# Patient Record
Sex: FEMALE | Race: BLACK OR AFRICAN AMERICAN
[De-identification: names, ages, dates, MRNs, and addresses within clinical notes are randomized per-mention and may not be internally consistent; named-entity substitution may affect disease eponyms.]

---

## 2023-04-12 ENCOUNTER — HOSPITAL ENCOUNTER (EMERGENCY)
Dept: HOSPITAL 76 - ED | Age: 28
Discharge: HOME | End: 2023-04-12
Payer: COMMERCIAL

## 2023-04-12 VITALS — DIASTOLIC BLOOD PRESSURE: 68 MMHG | SYSTOLIC BLOOD PRESSURE: 124 MMHG

## 2023-04-12 DIAGNOSIS — R10.13: Primary | ICD-10-CM

## 2023-04-12 LAB
ALBUMIN DIAFP-MCNC: 4.2 G/DL (ref 3.2–5.5)
ALBUMIN/GLOB SERPL: 1.2 {RATIO} (ref 1–2.2)
ALP SERPL-CCNC: 72 IU/L (ref 42–121)
ALT SERPL W P-5'-P-CCNC: 15 IU/L (ref 10–60)
ANION GAP SERPL CALCULATED.4IONS-SCNC: 8 MMOL/L (ref 6–13)
AST SERPL W P-5'-P-CCNC: 23 IU/L (ref 10–42)
BASOPHILS NFR BLD AUTO: 0 10^3/UL (ref 0–0.1)
BASOPHILS NFR BLD AUTO: 0.5 %
BILIRUB BLD-MCNC: 0.5 MG/DL (ref 0.2–1)
BUN SERPL-MCNC: 9 MG/DL (ref 6–20)
CALCIUM UR-MCNC: 8.9 MG/DL (ref 8.5–10.3)
CHLORIDE SERPL-SCNC: 104 MMOL/L (ref 101–111)
CLARITY UR REFRACT.AUTO: CLEAR
CO2 SERPL-SCNC: 23 MMOL/L (ref 21–32)
CREAT SERPLBLD-SCNC: 0.9 MG/DL (ref 0.4–1)
EOSINOPHIL # BLD AUTO: 0 10^3/UL (ref 0–0.7)
EOSINOPHIL NFR BLD AUTO: 0.3 %
ERYTHROCYTE [DISTWIDTH] IN BLOOD BY AUTOMATED COUNT: 11.9 % (ref 12–15)
GFRSERPLBLD MDRD-ARVRAT: 90 ML/MIN/{1.73_M2} (ref 89–?)
GLOBULIN SER-MCNC: 3.5 G/DL (ref 2.1–4.2)
GLUCOSE SERPL-MCNC: 113 MG/DL (ref 70–100)
GLUCOSE UR QL STRIP.AUTO: NEGATIVE MG/DL
HCG UR QL: NEGATIVE
HCT VFR BLD AUTO: 42.3 % (ref 37–47)
HGB UR QL STRIP: 14.3 G/DL (ref 12–16)
KETONES UR QL STRIP.AUTO: NEGATIVE MG/DL
LIPASE SERPL-CCNC: 41 U/L (ref 22–51)
LYMPHOCYTES # SPEC AUTO: 1.5 10^3/UL (ref 1.5–3.5)
LYMPHOCYTES NFR BLD AUTO: 17.4 %
MCH RBC QN AUTO: 29.8 PG (ref 27–31)
MCHC RBC AUTO-ENTMCNC: 33.8 G/DL (ref 32–36)
MCV RBC AUTO: 88.1 FL (ref 81–99)
MONOCYTES # BLD AUTO: 0.5 10^3/UL (ref 0–1)
MONOCYTES NFR BLD AUTO: 5.9 %
NEUTROPHILS # BLD AUTO: 6.5 10^3/UL (ref 1.5–6.6)
NEUTROPHILS # SNV AUTO: 8.6 X10^3/UL (ref 4.8–10.8)
NEUTROPHILS NFR BLD AUTO: 75.6 %
NITRITE UR QL STRIP.AUTO: NEGATIVE
NRBC # BLD AUTO: 0 /100WBC
NRBC # BLD AUTO: 0 X10^3/UL
PDW BLD AUTO: 9.2 FL (ref 7.9–10.8)
PH UR STRIP.AUTO: 6 PH (ref 5–7.5)
PLATELET # BLD: 339 10^3/UL (ref 130–450)
POTASSIUM SERPL-SCNC: 3.2 MMOL/L (ref 3.5–5)
PROT SPEC-MCNC: 7.7 G/DL (ref 6.7–8.2)
PROT UR STRIP.AUTO-MCNC: NEGATIVE MG/DL
RBC # UR STRIP.AUTO: NEGATIVE /UL
RBC MAR: 4.8 10^6/UL (ref 4.2–5.4)
SODIUM SERPLBLD-SCNC: 135 MMOL/L (ref 135–145)
SP GR UR STRIP.AUTO: <=1.005 (ref 1–1.03)
UROBILINOGEN UR QL STRIP.AUTO: (no result) E.U./DL
UROBILINOGEN UR STRIP.AUTO-MCNC: NEGATIVE MG/DL

## 2023-04-12 PROCEDURE — 85025 COMPLETE CBC W/AUTO DIFF WBC: CPT

## 2023-04-12 PROCEDURE — 83690 ASSAY OF LIPASE: CPT

## 2023-04-12 PROCEDURE — 80053 COMPREHEN METABOLIC PANEL: CPT

## 2023-04-12 PROCEDURE — 81025 URINE PREGNANCY TEST: CPT

## 2023-04-12 PROCEDURE — 81003 URINALYSIS AUTO W/O SCOPE: CPT

## 2023-04-12 PROCEDURE — 87086 URINE CULTURE/COLONY COUNT: CPT

## 2023-04-12 PROCEDURE — 99283 EMERGENCY DEPT VISIT LOW MDM: CPT

## 2023-04-12 PROCEDURE — 36415 COLL VENOUS BLD VENIPUNCTURE: CPT

## 2023-04-12 PROCEDURE — 81001 URINALYSIS AUTO W/SCOPE: CPT

## 2023-04-12 NOTE — ED PHYSICIAN DOCUMENTATION
PD HPI NVD





- Stated complaint


Stated Complaint: ABD PX





- Chief complaint


Chief Complaint: Abd Pain





- History obtained from


History obtained from: Patient





- Additonal information


Additional information: 


Patient is a 27-year-old female presenting for evaluation of epigastric 

abdominal pain with nausea that started this morning.  Patient reports that the 

pain was much more sharp and severe this morning but has improved after taking 

Midol.  She denies any vomiting and denies any radiation to her symptoms.  

Denies any diarrhea.  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 

alcohol. She reports that her command made her come in today for an evaluation.








Review of Systems


Constitutional: denies: Fever


Cardiac: denies: Chest pain / pressure


Respiratory: denies: Dyspnea


GI: reports: Abdominal Pain, Nausea.  denies: Vomiting, Diarrhea


: denies: Dysuria


Musculoskeletal: denies: Back pain


Neurologic: denies: Headache





PD PAST MEDICAL HISTORY





- Past Medical History


Past Medical History: Yes


Neuro: Migraines





- Present Medications


Home Medications: 


                                Ambulatory Orders











 Medication  Instructions  Recorded  Confirmed


 


Ondansetron Odt [Zofran] 4 mg TL Q6H PRN #10 tablet 04/12/23 














- Allergies


Allergies/Adverse Reactions: 


                                    Allergies











Allergy/AdvReac Type Severity Reaction Status Date / Time


 


No Known Drug Allergies Allergy   Verified 04/12/23 10:25














- Social History


Does the pt smoke?: No


Smoking Status: Never smoker





PD ED PE NORMAL





- General


General: Alert and oriented X 3, No acute distress, Well developed/nourished





- HEENT


HEENT: Atraumatic





- Neck


Neck: Supple, no meningeal sign





- Cardiac


Cardiac: RRR





- Respiratory


Respiratory: No respiratory distress, Clear bilaterally





- Abdomen


Abdomen: Normal bowel sounds, Soft, Non distended, Other (Mild epigastric 

tenderness to palpation, no right upper quadrant tenderness or Tenderness 

elsewhere; )





- Derm


Derm: Warm and dry





- Neuro


Neuro: Normal speech





Results





- Vitals


Vitals: 


                               Vital Signs - 24 hr











  04/12/23 04/12/23





  10:22 11:35


 


Temperature 36.4 C L 


 


Heart Rate 98 68


 


Respiratory 16 16





Rate  


 


Blood Pressure 152/104 H 124/68


 


O2 Saturation 99 100








                                     Oxygen











O2 Source                      Room air

















- Labs


Labs: 


                                Laboratory Tests











  04/12/23 04/12/23 04/12/23





  10:40 10:47 10:47


 


WBC   8.6 


 


RBC   4.80 


 


Hgb   14.3 


 


Hct   42.3 


 


MCV   88.1 


 


MCH   29.8 


 


MCHC   33.8 


 


RDW   11.9 L 


 


Plt Count   339 


 


MPV   9.2 


 


Neut # (Auto)   6.5 


 


Lymph # (Auto)   1.5 


 


Mono # (Auto)   0.5 


 


Eos # (Auto)   0.0 


 


Baso # (Auto)   0.0 


 


Absolute Nucleated RBC   0.00 


 


Nucleated RBC %   0.0 


 


Sodium    135


 


Potassium    3.2 L


 


Chloride    104


 


Carbon Dioxide    23


 


Anion Gap    8.0


 


BUN    9


 


Creatinine    0.9


 


Estimated GFR (MDRD)    90


 


Glucose    113 H


 


Calcium    8.9


 


Total Bilirubin    0.5


 


AST    23


 


ALT    15


 


Alkaline Phosphatase    72


 


Total Protein    7.7


 


Albumin    4.2


 


Globulin    3.5


 


Albumin/Globulin Ratio    1.2


 


Lipase    41


 


Urine Color  LIGHT YELLOW  


 


Urine Clarity  CLEAR  


 


Urine pH  6.0  


 


Ur Specific Gravity  <=1.005  


 


Urine Protein  NEGATIVE  


 


Urine Glucose (UA)  NEGATIVE  


 


Urine Ketones  NEGATIVE  


 


Urine Occult Blood  NEGATIVE  


 


Urine Nitrite  NEGATIVE  


 


Urine Bilirubin  NEGATIVE  


 


Urine Urobilinogen  0.2 (NORMAL)  


 


Ur Leukocyte Esterase  NEGATIVE  


 


Ur Microscopic Review  NOT INDICATED  


 


Urine Culture Comments  NOT INDICATED  


 


Urine HCG, Qual  NEGATIVE  














PD Medical Decision Making





- ED course


Complexity details: reviewed results, re-evaluated patient


ED course: 


Patient presenting for evaluation of epigastric abdominal pain with nausea.  Her

 urine analysis is negative for infection or pregnancy.  CBC and chemistries 

were also reviewed with only significant finding to include a potassium of 3.2 

which was replaced p.o.  Her abdominal exam is benign.  She has had no vomiting 

here.  She is tolerating p.o. including her potassium replacement.She has no 

right upper quadrant tenderness or pain elsewhere on exam and do not think 

imaging would be helpful at this time.  Patient counseled on continued 

supportive care as well as concerning symptoms to return for.








Departure





- Departure


Disposition: 01 Home, Self Care


Clinical Impression: 


 Epigastric abdominal pain





Condition: Stable


Instructions:  ED Abdominal Pain Female Non-Specific Abdominal Pain


Follow-Up: 


[facility] [place] [Provider Group]


Prescriptions: 


Ondansetron Odt [Zofran] 4 mg TL Q6H PRN #10 tablet


 PRN Reason: Nausea / Vomiting


Comments: 


Your labs are reassuring today.  Your urine also does not show infection.  Your 

pregnancy test is negative.





I have sent a prescription for antinausea medications to the [facility] [place].  Please stick with a bland diet today.  If you develop any new or 

worsening symptoms please consider return to the emergency department.


Forms:  Activity restrictions


Discharge Date/Time: 04/12/23 11:36

## 2023-05-05 ENCOUNTER — HOSPITAL ENCOUNTER (EMERGENCY)
Dept: HOSPITAL 76 - ED | Age: 28
Discharge: HOME | End: 2023-05-05
Payer: COMMERCIAL

## 2023-05-05 VITALS — DIASTOLIC BLOOD PRESSURE: 80 MMHG | SYSTOLIC BLOOD PRESSURE: 132 MMHG
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
## 2023-05-05 DIAGNOSIS — S81.012A: Primary | ICD-10-CM
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
## 2023-05-05 PROCEDURE — 12001 RPR S/N/AX/GEN/TRNK 2.5CM/<: CPT

## 2023-05-05 PROCEDURE — 99283 EMERGENCY DEPT VISIT LOW MDM: CPT

## 2023-05-05 NOTE — ED PHYSICIAN DOCUMENTATION
PD HPI LOWER EXT INJURY





- Stated complaint


Stated Complaint: LT KNEE INJ





- Chief complaint


Chief Complaint: Trauma Ext





- History obtained from


History obtained from: Patient





- Additional information


Additional information: 


Patient is a 27-year-old female presenting for evaluation of laceration to left 

knee that occurred this morning.  Patient states that she was speed walking at 

work when she excellently bumped her knee against a metal box and fell forward. 

She did not hit her head or have LOC.  Her tetanus is up-to-date.  She is not on

a blood thinner.  She has been able to ambulate.Patient is [employer].








Review of Systems


Constitutional: denies: Fever


Cardiac: denies: Chest pain / pressure


Respiratory: denies: Dyspnea


GI: denies: Abdominal Pain


Skin: reports: Laceration (s)


Musculoskeletal: reports: Extremity pain


Neurologic: denies: Head injury





PD PAST MEDICAL HISTORY





- Past Medical History


Neuro: Migraines





- Present Medications


Home Medications: 


                                Ambulatory Orders











 Medication  Instructions  Recorded  Confirmed


 


Ondansetron Odt [Zofran] 4 mg TL Q6H PRN #10 tablet 04/12/23 


 


Sertraline [Zoloft] 50 mg PO DAILY 05/05/23 05/05/23














- Allergies


Allergies/Adverse Reactions: 


                                    Allergies











Allergy/AdvReac Type Severity Reaction Status Date / Time


 


No Known Drug Allergies Allergy   Verified 05/05/23 09:06














- Social History


Does the pt smoke?: No


Smoking Status: Never smoker





PD ED PE NORMAL





- General


General: Alert and oriented X 3, No acute distress, Well developed/nourished





- HEENT


HEENT: Atraumatic





- Neck


Neck: Supple, no meningeal sign





- Cardiac


Cardiac: Strong equal pulses





- Respiratory


Respiratory: No respiratory distress





- Derm


Derm: Warm and dry





- Extremities


Extremities: Normal ROM s pain, Other (2 cm Laceration Over superior portion of 

patella, Normal range of motion of left knee,)





- Neuro


Neuro: No motor deficit, No sensory deficit, Normal speech





Results





- Vitals


Vitals: 


                               Vital Signs - 24 hr











  05/05/23 05/05/23





  09:05 10:39


 


Temperature 36.6 C 


 


Heart Rate 91 90


 


Respiratory 16 16





Rate  


 


Blood Pressure 148/90 H 132/80 H


 


O2 Saturation 98 100








                                     Oxygen











O2 Source                      Room air

















Procedures





- Laceration (location)


  ** Left knee


Length in cm: 2


Wound type: Linear, Into subcut fat, Clean, Other (No signs that it extends into

 the joint capsule)


Neurovascular status: Sensory intact, Motor intact, Vascular intact


Tendon involvement: Tendon intact


Anesthesia: Lidocaine 1% with epi


Wound preparation: Hibiclens, Irrigated copiously NS, Wound explored, To the 

base


Skin layer closure: Size #-0 - enter number (4), Sutures - enter # (6)


Other: Patient tolerated well, No complications, Neurovascular intact, Dressing 

applied, Tetanus UTD





PD Medical Decision Making





- ED course


Complexity details: reviewed results, re-evaluated patient, d/w patient


ED course: 


Patient presenting for evaluation of Left knee laceration and injury.  She has 

good range of motion.  I reviewed her x-ray and see no signs for fracture or 

dislocation.  I did explore the laceration and do not see signs that it violates

 into the joint space.The wound was copiously cleaned and irrigated.  It was 

sutured.  An Ace wrap was applied.  I did offer the patient crutches to help her

 stay off of it but she declines.  She understands that she should avoid 

movements that require a lot of bending at the knee or work on her knees.She 

understands the need for suture removal in 10 to 14 days as well as concerning 

symptoms to return for.Her tetanus is up-to-date.








Departure





- Departure


Disposition: 01 Home, Self Care


Clinical Impression: 


 Left knee injury, Laceration of left knee





Condition: Stable


Instructions:  ED Knee Pain UKO, ED Laceration Ext Sutr Stap Tape


Comments: 


Your x-ray does not show a broken or out of place bone in your knee.  You do 

have a laceration to your knee that was closed with sick stitches.  The stitches

 should remain in place for the next 10 to 14 days (05/19/23).  You can return 

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  I will have you on 

work restrictions as you should not be doing work on your knees or other work 

that requires excessive bending of your knees.  Please take caution given at the

 location of the laceration.  We have been applied an Ace wrap today.  You 

continue using the Ace wrap to help prevent you from bending the knee too much 

or I would recommend using a over-the-counter knee brace.If you are having pain 

I would continue with anti-inflammatory such as ibuprofen or acetaminophen, ice 

and resting.  I would recommend close follow-up with your primary care provider 

if you are continuing to have pain.


Forms:  Activity restrictions


Discharge Date/Time: 05/05/23 10:40

## 2023-05-05 NOTE — XRAY REPORT
PROCEDURE:  Knee 3 View LT

 

INDICATIONS:  injury

 

TECHNIQUE:  3 views of the left knee(s) were acquired.  

 

COMPARISON:  None.

 

FINDINGS:  

 

Bones:  No fractures or dislocations.  No suspicious bony lesions.  

 

Soft tissues:  No knee joint effusion. No suspicious soft tissue calcifications or masses.  

 

 

IMPRESSION:  

No acute bony abnormality.

 

 

 

 

Reviewed by: Mckay Matos MD on 5/5/2023 9:27 AM PDT

Approved by: Mckay Matos MD on 5/5/2023 9:27 AM PDT

 

 

Station ID:  [id number]